# Patient Record
Sex: MALE | Race: WHITE | NOT HISPANIC OR LATINO | RURAL
[De-identification: names, ages, dates, MRNs, and addresses within clinical notes are randomized per-mention and may not be internally consistent; named-entity substitution may affect disease eponyms.]

---

## 2021-01-19 ENCOUNTER — APPOINTMENT (RX ONLY)
Dept: RURAL CLINIC 3 | Facility: CLINIC | Age: 71
Setting detail: DERMATOLOGY
End: 2021-01-19

## 2021-01-19 DIAGNOSIS — L30.9 DERMATITIS, UNSPECIFIED: ICD-10-CM

## 2021-01-19 PROCEDURE — ? PRESCRIPTION

## 2021-01-19 PROCEDURE — ? DIAGNOSIS COMMENT

## 2021-01-19 PROCEDURE — 99204 OFFICE O/P NEW MOD 45 MIN: CPT

## 2021-01-19 PROCEDURE — ? ORDER TESTS

## 2021-01-19 PROCEDURE — ? LAB REPORTS REVIEWED

## 2021-01-19 RX ORDER — BETAMETHASONE DIPROPIONATE 0.5 MG/G
CREAM TOPICAL
Qty: 1 | Refills: 0 | Status: ERX | COMMUNITY
Start: 2021-01-19

## 2021-01-19 RX ADMIN — BETAMETHASONE DIPROPIONATE: 0.5 CREAM TOPICAL at 00:00

## 2021-01-19 NOTE — PROCEDURE: DIAGNOSIS COMMENT
Detail Level: Simple
Comment: Differential diagnosis includes pernio (chilblains), vasculitis, cold panniculitis, cryoglobulinemia, cryofibrinogenemia, \"Covid toes\" (patient gives no history suggestive of previous or current COVID-19 or known exposure).\\n\\nLesions have been present for 5+ weeks after working in a cold garage.  He denies itching but says they are tender.  He denies symptoms of Raynaud's, photosensitivity, mouth ulcerations..  He states that he has osteoarthritis.\\n\\nHe exhibits pink to red periungual swollen areas, some with some superficial crusting/ulcerations.  No apparent proximal nail fold telangiectasia.  No splinter hemorrhages.  Feet not involved.\\n\\nPatient states he has had quite a bit of lab work including \"autoimmune tests\".  We will request these from [facility] Rebolledo, and then determine if further labs should be ordered.  He has an appointment with an oncologist/hematologist later this week for evaluation of his anemia (hemoglobin of 7).
Render Risk Assessment In Note?: no

## 2021-01-19 NOTE — PROCEDURE: LAB REPORTS REVIEWED
Summarized Lab Results: WBC 1.61, hemoglobin 7.7, platelets 180 K, abnormal peripheral smear.
Detail Level: Zone
Labs Reviewed Override: ESR, peripheral smear, CBC

## 2021-01-19 NOTE — HPI: SKIN LESIONS
treated_been_treated
Is This A New Presentation, Or A Follow-Up?: Skin Lesions
Additional History: Referral KRISTIAN Whiting\\nReferral from KRISTIAN Nielsen/Penny Sanchez MD for chilblains.\\nVisit note 1/8/2021: \"multiple erythematous slightly edematous lesions primarily to his fingers\\nbilaterally. No toe lesions. 2 of these lesions demonstrate slight crusting ulcer without any\\nsurrounding stigmata of infection.\"\\nPatient gave history of finger lesions and recent lab abnormalities. He recently moved from [facility] and was found down there by his nurse practitioner to have a hemoglobin of 7 and\\npancytopenia. He also gave a 4-week history of what he suspects is chilblains after working in a\\nunheated garage. 1 lesion on the left index finger ulcerated and crusted. No history of Raynaud's.\\nBeing referred to heme-onc for pancytopenia started on topical nitroglycerin ointment 2% every 6\\nhours

## 2021-02-02 ENCOUNTER — APPOINTMENT (RX ONLY)
Dept: RURAL CLINIC 3 | Facility: CLINIC | Age: 71
Setting detail: DERMATOLOGY
End: 2021-02-02

## 2021-02-02 DIAGNOSIS — L30.9 DERMATITIS, UNSPECIFIED: ICD-10-CM

## 2021-02-02 PROCEDURE — ? PRESCRIPTION MEDICATION MANAGEMENT

## 2021-02-02 PROCEDURE — 99212 OFFICE O/P EST SF 10 MIN: CPT

## 2021-02-02 PROCEDURE — ? DIAGNOSIS COMMENT

## 2021-02-02 ASSESSMENT — LOCATION ZONE DERM: LOCATION ZONE: FINGER

## 2021-02-02 ASSESSMENT — LOCATION DETAILED DESCRIPTION DERM
LOCATION DETAILED: RIGHT MID DORSAL INDEX FINGER
LOCATION DETAILED: LEFT DISTAL RADIAL DORSAL INDEX FINGER
LOCATION DETAILED: RIGHT MID DORSAL MIDDLE FINGER
LOCATION DETAILED: RIGHT RING DISTAL INTERPHALANGEAL JOINT
LOCATION DETAILED: LEFT MID DORSAL MIDDLE FINGER

## 2021-02-02 ASSESSMENT — LOCATION SIMPLE DESCRIPTION DERM
LOCATION SIMPLE: RIGHT INDEX FINGER
LOCATION SIMPLE: RIGHT RING FINGER
LOCATION SIMPLE: RIGHT MIDDLE FINGER
LOCATION SIMPLE: LEFT INDEX FINGER
LOCATION SIMPLE: LEFT MIDDLE FINGER

## 2021-02-02 NOTE — PROCEDURE: DIAGNOSIS COMMENT
Render Risk Assessment In Note?: no
Detail Level: Simple
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  Patient recently diagnosed with myelodysplasia.  This may be related to the development of his chilblains.  He did not obtain the laboratory test recommended at his last visit.  We discussed that I still feel it is important to obtain those to rule out cryoglobulinemia, etc.  The dermatitis is improved and he is being careful with keeping his hands warm.  We also discussed use of the betamethasone and that he should only use it now on new lesions.  Discussed risks of atrophy.\\n\\nHe apparently will be going to [place] for treatment of his myelodysplasia soon, and will follow up regarding his dermatitis with them.

## 2021-02-02 NOTE — PROCEDURE: PRESCRIPTION MEDICATION MANAGEMENT
Plan: Obtain lab ordered at last visit.\\nWe will notify him of lab results.  Also recommended that he discuss the hand dermatitis with his hematologist..  Will send copy of notes to his hematologist, Dr. Martinez.
Modify Regimen: Discontinue betamethasone cream, except for use prn for new lesions.
Detail Level: Zone
Continue Regimen: Vanicream for moisturizing regularly.
Render In Strict Bullet Format?: No